# Patient Record
Sex: MALE | Race: WHITE | HISPANIC OR LATINO | URBAN - METROPOLITAN AREA
[De-identification: names, ages, dates, MRNs, and addresses within clinical notes are randomized per-mention and may not be internally consistent; named-entity substitution may affect disease eponyms.]

---

## 2022-09-06 ENCOUNTER — OFFICE VISIT (OUTPATIENT)
Dept: URBAN - METROPOLITAN AREA CLINIC 68 | Facility: CLINIC | Age: 37
End: 2022-09-06

## 2022-09-06 ENCOUNTER — LAB OUTSIDE AN ENCOUNTER (OUTPATIENT)
Dept: URBAN - METROPOLITAN AREA CLINIC 68 | Facility: CLINIC | Age: 37
End: 2022-09-06

## 2022-09-06 RX ORDER — FAMOTIDINE 10 MG/1
1 TABLET AS NEEDED TABLET, FILM COATED ORAL TWICE A DAY
Status: ACTIVE | COMMUNITY

## 2022-09-06 RX ORDER — PANTOPRAZOLE SODIUM 40 MG/1
1 TABLET TABLET, DELAYED RELEASE ORAL ONCE A DAY
Status: ACTIVE | COMMUNITY

## 2022-09-06 RX ORDER — METRONIDAZOLE 500 MG/1
1 TABLET TABLET ORAL THREE TIMES A DAY
Status: ACTIVE | COMMUNITY

## 2022-09-06 RX ORDER — TRIMETHOBENZAMIDE HYDROCHLORIDE 300 MG/1
1 CAPSULE AS NEEDED CAPSULE ORAL EVERY 12 HOURS
Qty: 60 CAPSULE | OUTPATIENT
Start: 2022-09-06

## 2022-09-06 RX ORDER — PANTOPRAZOLE SODIUM 40 MG/1
1 TABLET TABLET, DELAYED RELEASE ORAL EVERY 12 HOURS
Qty: 180 TABLET | OUTPATIENT
Start: 2022-09-06

## 2022-09-06 RX ORDER — METOCLOPRAMIDE 5 MG/1
1 TABLET TABLET ORAL QID
Status: ACTIVE | COMMUNITY

## 2022-09-06 RX ORDER — CIPROFLOXACIN 500 MG/1
1 TABLET TABLET, FILM COATED ORAL
Status: ACTIVE | COMMUNITY

## 2022-09-06 NOTE — HPI-MIGRATED HPI
General : Patient has been experiencing bouts of burning abdominal epigastric pain that radiates up the middle of the chest. He also has bout of nausea and non-bloody emesis. He recently was seen in the ER for these results of cat scan detailed below. He has also undergone EGD and colonoscopy for evaluation of his symptoms. He is currently on antibiotics due to cat scan findings. He comes in today for follow up.

## 2022-11-15 ENCOUNTER — TELEPHONE ENCOUNTER (OUTPATIENT)
Dept: URBAN - METROPOLITAN AREA CLINIC 68 | Facility: CLINIC | Age: 37
End: 2022-11-15

## 2022-11-18 ENCOUNTER — LAB OUTSIDE AN ENCOUNTER (OUTPATIENT)
Dept: URBAN - METROPOLITAN AREA CLINIC 68 | Facility: CLINIC | Age: 37
End: 2022-11-18

## 2022-11-18 ENCOUNTER — OFFICE VISIT (OUTPATIENT)
Dept: URBAN - METROPOLITAN AREA CLINIC 68 | Facility: CLINIC | Age: 37
End: 2022-11-18

## 2022-11-18 RX ORDER — TRIMETHOBENZAMIDE HYDROCHLORIDE 300 MG/1
1 CAPSULE AS NEEDED CAPSULE ORAL EVERY 12 HOURS
Qty: 60 CAPSULE | Status: ACTIVE | COMMUNITY
Start: 2022-09-06

## 2022-11-18 RX ORDER — FAMOTIDINE 10 MG/1
1 TABLET AS NEEDED TABLET, FILM COATED ORAL TWICE A DAY
Status: ACTIVE | COMMUNITY

## 2022-11-18 RX ORDER — METRONIDAZOLE 500 MG/1
1 TABLET TABLET ORAL THREE TIMES A DAY
Status: ACTIVE | COMMUNITY

## 2022-11-18 RX ORDER — DICYCLOMINE HYDROCHLORIDE 10 MG/1
1 CAPSULE CAPSULE ORAL THREE TIMES A DAY
Qty: 270 CAPSULE | OUTPATIENT
Start: 2022-11-18 | End: 2023-02-16

## 2022-11-18 RX ORDER — CIPROFLOXACIN 500 MG/1
1 TABLET TABLET, FILM COATED ORAL
COMMUNITY

## 2022-11-18 RX ORDER — METOCLOPRAMIDE 5 MG/1
1 TABLET TABLET ORAL QID
Status: ACTIVE | COMMUNITY

## 2022-11-18 RX ORDER — PANTOPRAZOLE SODIUM 40 MG/1
1 TABLET TABLET, DELAYED RELEASE ORAL EVERY 12 HOURS
Qty: 180 TABLET | Status: ACTIVE | COMMUNITY
Start: 2022-09-06

## 2022-11-18 RX ORDER — PANTOPRAZOLE SODIUM 40 MG/1
1 TABLET TABLET, DELAYED RELEASE ORAL ONCE A DAY
Status: ACTIVE | COMMUNITY

## 2022-11-18 NOTE — EXAM-MIGRATED EXAMINATIONS
General Examination: Head: -> normocephalic, atraumatic   Eyes: -> pupils equal, round, reactive to light and accommodation, sclera anicteric   Ears: -> normal   Oral cavity: -> mucosa moist   Neck / thyroid: -> neck is supple, with full range of motion and no cervical lymphadenopathy, normal thyroid size and shape without nodules, or tenderness   Rectal: -> not examined   Extremities: -> normal extremity with no clubbing, cyanosis or edema   Neurologic: -> alert and oriented, normal exam with no motor or sensory deficits   Lymph nodes: -> no axillary, supraclavicular or inguinal lymphadenopathy   Skin: -> skin is warm and dry, with no rashes, good skin turgor and normal hair distribution, with no suspicious skin lesions   Heart: -> regular rate and rhythm without murmurs, gallops, clicks or rubs   Lungs: -> clear to auscultation bilaterally, with good air movement and no rales, rhonchi or wheezes   Breasts: ->    Abdomen: -> soft with good bowel sounds, nontender, and no masses or hepatosplenomegaly   General appearance: -> alert, pleasant, well-nourished and in no acute distress

## 2022-11-18 NOTE — HPI-MIGRATED HPI
General : Patient has been experiencing bouts of burning abdominal epigastric pain that radiates up the middle of the chest. He also has bout of nausea and non-bloody emesis. He recently was seen in the ER for these results of cat scan detailed below. He was given antibiotic due to cat scan findings.  He has also undergone EGD and colonoscopy for evaluation of his symptoms. Results requested not obtained yet.  GES was done remarkable for a delayed transit time from the upper abdomen to the upper stomach. Patient continues with abdominal pain nausea vomits. he has been havint poor PO intake and abdominal pain. He refers he did do an EGD several month ago. He is unable to specify finding. He complaints of severe abdominal pain. He comes in today for follow up

## 2022-11-20 ENCOUNTER — TELEPHONE ENCOUNTER (OUTPATIENT)
Dept: URBAN - METROPOLITAN AREA CLINIC 68 | Facility: CLINIC | Age: 37
End: 2022-11-20

## 2022-11-20 RX ORDER — DICYCLOMINE HYDROCHLORIDE 10 MG/1
1 CAPSULE CAPSULE ORAL THREE TIMES A DAY
Qty: 270 CAPSULE | Status: ACTIVE | COMMUNITY
Start: 2022-11-18 | End: 2023-02-16

## 2022-11-20 RX ORDER — METRONIDAZOLE 500 MG/1
1 TABLET TABLET ORAL THREE TIMES A DAY
Status: ACTIVE | COMMUNITY

## 2022-11-20 RX ORDER — METOCLOPRAMIDE 5 MG/1
1 TABLET TABLET ORAL QID
Status: ACTIVE | COMMUNITY

## 2022-11-20 RX ORDER — TRIMETHOBENZAMIDE HYDROCHLORIDE 300 MG/1
1 CAPSULE AS NEEDED CAPSULE ORAL EVERY 12 HOURS
Qty: 60 CAPSULE | Status: ACTIVE | COMMUNITY
Start: 2022-09-06

## 2022-11-20 RX ORDER — CIPROFLOXACIN 500 MG/1
1 TABLET TABLET, FILM COATED ORAL
COMMUNITY

## 2022-11-20 RX ORDER — FAMOTIDINE 10 MG/1
1 TABLET AS NEEDED TABLET, FILM COATED ORAL TWICE A DAY
Status: ACTIVE | COMMUNITY

## 2022-11-20 RX ORDER — PANTOPRAZOLE SODIUM 40 MG/1
1 TABLET TABLET, DELAYED RELEASE ORAL ONCE A DAY
Status: ACTIVE | COMMUNITY

## 2022-11-20 RX ORDER — PANTOPRAZOLE SODIUM 40 MG/1
1 TABLET TABLET, DELAYED RELEASE ORAL EVERY 12 HOURS
Qty: 180 TABLET | Status: ACTIVE | COMMUNITY
Start: 2022-09-06

## 2022-11-20 RX ORDER — SOD SULF/POT CHLORIDE/MAG SULF 1.479 G
AS DIRECTED TABLET ORAL ONCE
Qty: 1 KIT | OUTPATIENT
Start: 2022-11-27 | End: 2022-11-28

## 2022-11-27 ENCOUNTER — LAB OUTSIDE AN ENCOUNTER (OUTPATIENT)
Dept: URBAN - METROPOLITAN AREA CLINIC 68 | Facility: CLINIC | Age: 37
End: 2022-11-27

## 2022-12-08 ENCOUNTER — OFFICE VISIT (OUTPATIENT)
Dept: URBAN - METROPOLITAN AREA SURGERY CENTER 12 | Facility: SURGERY CENTER | Age: 37
End: 2022-12-08

## 2022-12-08 RX ORDER — FAMOTIDINE 10 MG/1
1 TABLET AS NEEDED TABLET, FILM COATED ORAL TWICE A DAY
Status: ACTIVE | COMMUNITY

## 2022-12-08 RX ORDER — PANTOPRAZOLE SODIUM 40 MG/1
1 TABLET TABLET, DELAYED RELEASE ORAL ONCE A DAY
Status: ACTIVE | COMMUNITY

## 2022-12-08 RX ORDER — CIPROFLOXACIN 500 MG/1
1 TABLET TABLET, FILM COATED ORAL
COMMUNITY

## 2022-12-08 RX ORDER — PANTOPRAZOLE SODIUM 40 MG/1
1 TABLET TABLET, DELAYED RELEASE ORAL EVERY 12 HOURS
Qty: 180 TABLET | Status: ACTIVE | COMMUNITY
Start: 2022-09-06

## 2022-12-08 RX ORDER — METOCLOPRAMIDE 5 MG/1
1 TABLET TABLET ORAL QID
Status: ACTIVE | COMMUNITY

## 2022-12-08 RX ORDER — DICYCLOMINE HYDROCHLORIDE 10 MG/1
1 CAPSULE CAPSULE ORAL THREE TIMES A DAY
Qty: 270 CAPSULE | Status: ACTIVE | COMMUNITY
Start: 2022-11-18 | End: 2023-02-16

## 2022-12-08 RX ORDER — TRIMETHOBENZAMIDE HYDROCHLORIDE 300 MG/1
1 CAPSULE AS NEEDED CAPSULE ORAL EVERY 12 HOURS
Qty: 60 CAPSULE | Status: ACTIVE | COMMUNITY
Start: 2022-09-06

## 2022-12-08 RX ORDER — METRONIDAZOLE 500 MG/1
1 TABLET TABLET ORAL THREE TIMES A DAY
Status: ACTIVE | COMMUNITY

## 2022-12-09 ENCOUNTER — TELEPHONE ENCOUNTER (OUTPATIENT)
Dept: URBAN - METROPOLITAN AREA CLINIC 68 | Facility: CLINIC | Age: 37
End: 2022-12-09

## 2022-12-12 ENCOUNTER — TELEPHONE ENCOUNTER (OUTPATIENT)
Dept: URBAN - METROPOLITAN AREA CLINIC 68 | Facility: CLINIC | Age: 37
End: 2022-12-12

## 2022-12-14 ENCOUNTER — TELEPHONE ENCOUNTER (OUTPATIENT)
Dept: URBAN - METROPOLITAN AREA CLINIC 68 | Facility: CLINIC | Age: 37
End: 2022-12-14

## 2023-01-06 ENCOUNTER — TELEPHONE ENCOUNTER (OUTPATIENT)
Dept: URBAN - METROPOLITAN AREA CLINIC 68 | Facility: CLINIC | Age: 38
End: 2023-01-06

## 2023-01-09 ENCOUNTER — OFFICE VISIT (OUTPATIENT)
Dept: URBAN - METROPOLITAN AREA CLINIC 68 | Facility: CLINIC | Age: 38
End: 2023-01-09

## 2023-01-09 ENCOUNTER — TELEPHONE ENCOUNTER (OUTPATIENT)
Dept: URBAN - METROPOLITAN AREA CLINIC 68 | Facility: CLINIC | Age: 38
End: 2023-01-09

## 2023-01-09 RX ORDER — SOD SULF/POT CHLORIDE/MAG SULF 1.479 G
AS DIRECTED TABLET ORAL ONCE
Qty: 1 KIT | OUTPATIENT
Start: 2023-01-09 | End: 2023-01-10

## 2023-01-09 RX ORDER — DICYCLOMINE HYDROCHLORIDE 10 MG/1
1 CAPSULE CAPSULE ORAL THREE TIMES A DAY
Qty: 270 CAPSULE | Status: ACTIVE | COMMUNITY
Start: 2022-11-18 | End: 2023-02-16

## 2023-01-09 RX ORDER — METRONIDAZOLE 500 MG/1
1 TABLET TABLET ORAL THREE TIMES A DAY
Status: ACTIVE | COMMUNITY

## 2023-01-09 RX ORDER — METOCLOPRAMIDE 5 MG/1
1 TABLET TABLET ORAL QID
Status: ACTIVE | COMMUNITY

## 2023-01-09 RX ORDER — PANTOPRAZOLE SODIUM 40 MG/1
1 TABLET TABLET, DELAYED RELEASE ORAL ONCE A DAY
Status: ACTIVE | COMMUNITY

## 2023-01-09 RX ORDER — FAMOTIDINE 10 MG/1
1 TABLET AS NEEDED TABLET, FILM COATED ORAL TWICE A DAY
Status: ACTIVE | COMMUNITY

## 2023-01-09 RX ORDER — TRIMETHOBENZAMIDE HYDROCHLORIDE 300 MG/1
1 CAPSULE AS NEEDED CAPSULE ORAL EVERY 12 HOURS
Qty: 60 CAPSULE | Status: ACTIVE | COMMUNITY
Start: 2022-09-06

## 2023-01-09 RX ORDER — CIPROFLOXACIN 500 MG/1
1 TABLET TABLET, FILM COATED ORAL
COMMUNITY

## 2023-01-09 NOTE — EXAM-MIGRATED EXAMINATIONS
General Examination: Breasts: ->    Abdomen: -> soft with good bowel sounds, nontender, and no masses or hepatosplenomegaly   Rectal: -> not examined   Extremities: -> normal extremity with no clubbing, cyanosis or edema   Neurologic: -> alert and oriented, normal exam with no motor or sensory deficits   Oral cavity: -> mucosa moist   Neck / thyroid: -> neck is supple, with full range of motion and no cervical lymphadenopathy, normal thyroid size and shape without nodules, or tenderness   Lymph nodes: -> no axillary, supraclavicular or inguinal lymphadenopathy   Skin: -> skin is warm and dry, with no rashes, good skin turgor and normal hair distribution, with no suspicious skin lesions   Heart: -> regular rate and rhythm without murmurs, gallops, clicks or rubs   Lungs: -> clear to auscultation bilaterally, with good air movement and no rales, rhonchi or wheezes   General appearance: -> alert, pleasant, well-nourished and in no acute distress   Head: -> normocephalic, atraumatic   Eyes: -> pupils equal, round, reactive to light and accommodation, sclera anicteric   Ears: -> normal

## 2023-01-09 NOTE — HPI-MIGRATED HPI
General : 37 YOM that comes in today for follow up. He refers that last week Friday he was hospitalize due to bouts of nausea vomits diarrhea. Initially vomits were gastric content but then turned bloody. He underwent blood transfusion  2 units. He never underwent EGD. His diarrhea subsided and he decided to leave. He comes in today for follow up. He refers that his nausea is more controlled today. He has not had recurrence of his hematemesis. He does not have abdominal pain.

## 2023-01-10 ENCOUNTER — LAB OUTSIDE AN ENCOUNTER (OUTPATIENT)
Dept: URBAN - METROPOLITAN AREA CLINIC 68 | Facility: CLINIC | Age: 38
End: 2023-01-10

## 2023-01-11 ENCOUNTER — TELEPHONE ENCOUNTER (OUTPATIENT)
Dept: URBAN - METROPOLITAN AREA CLINIC 68 | Facility: CLINIC | Age: 38
End: 2023-01-11

## 2023-01-11 LAB
CLIENT EDUCATION TRACKING: (no result)
HEMATOCRIT: 34.9
HEMOGLOBIN: 11.6
MCH: 29.4
MCHC: 33.2
MCV: 88.4
MPV: 11
PLATELET COUNT: 201
RDW: 13.7
RED BLOOD CELL COUNT: 3.95
WHITE BLOOD CELL COUNT: 6.9

## 2023-01-18 ENCOUNTER — OFFICE VISIT (OUTPATIENT)
Dept: URBAN - METROPOLITAN AREA SURGERY CENTER 12 | Facility: SURGERY CENTER | Age: 38
End: 2023-01-18

## 2023-03-27 ENCOUNTER — TELEPHONE ENCOUNTER (OUTPATIENT)
Dept: URBAN - METROPOLITAN AREA CLINIC 68 | Facility: CLINIC | Age: 38
End: 2023-03-27

## 2023-03-27 ENCOUNTER — DASHBOARD ENCOUNTERS (OUTPATIENT)
Age: 38
End: 2023-03-27

## 2023-03-27 ENCOUNTER — OFFICE VISIT (OUTPATIENT)
Dept: URBAN - METROPOLITAN AREA CLINIC 68 | Facility: CLINIC | Age: 38
End: 2023-03-27

## 2023-03-27 RX ORDER — METRONIDAZOLE 500 MG/1
1 TABLET TABLET ORAL THREE TIMES A DAY
Status: ACTIVE | COMMUNITY

## 2023-03-27 RX ORDER — METOCLOPRAMIDE 5 MG/1
1 TABLET TABLET ORAL QID
Status: ACTIVE | COMMUNITY

## 2023-03-27 RX ORDER — FAMOTIDINE 10 MG/1
1 TABLET AS NEEDED TABLET, FILM COATED ORAL TWICE A DAY
Status: ACTIVE | COMMUNITY

## 2023-03-27 RX ORDER — TRIMETHOBENZAMIDE HYDROCHLORIDE 300 MG/1
1 CAPSULE AS NEEDED CAPSULE ORAL EVERY 12 HOURS
Qty: 60 CAPSULE | Status: ACTIVE | COMMUNITY
Start: 2022-09-06

## 2023-03-27 RX ORDER — PANTOPRAZOLE SODIUM 40 MG/1
1 TABLET TABLET, DELAYED RELEASE ORAL ONCE A DAY
Status: ACTIVE | COMMUNITY

## 2023-03-27 RX ORDER — CIPROFLOXACIN 500 MG/1
1 TABLET TABLET, FILM COATED ORAL
COMMUNITY

## 2023-03-27 NOTE — HPI-MIGRATED HPI
General : Case of a 37-year-old male patient that I agree today with A/V [facility] follow-up.  Patient has a pertinent history of delayed gastric emptying/gastroparesis diabetic induced.  He manages condition with as needed antiemetics during bouts of exacerbation.  He refers that he had remained stable without any bouts of nausea vomiting until yesterday where he started with significant bouts of nausea and vomiting.  He has poor p.o. intake.  Denies hematemesis.  Denies diarrhea.  Denies melena.

## 2023-04-06 ENCOUNTER — OFFICE VISIT (OUTPATIENT)
Dept: URBAN - METROPOLITAN AREA SURGERY CENTER 12 | Facility: SURGERY CENTER | Age: 38
End: 2023-04-06